# Patient Record
Sex: MALE | Race: NATIVE HAWAIIAN OR OTHER PACIFIC ISLANDER
[De-identification: names, ages, dates, MRNs, and addresses within clinical notes are randomized per-mention and may not be internally consistent; named-entity substitution may affect disease eponyms.]

---

## 2020-10-21 ENCOUNTER — HOSPITAL ENCOUNTER (EMERGENCY)
Dept: HOSPITAL 5 - ED | Age: 25
LOS: 1 days | Discharge: HOME | End: 2020-10-22
Payer: SELF-PAY

## 2020-10-21 DIAGNOSIS — F19.10: Primary | ICD-10-CM

## 2020-10-21 PROCEDURE — 82553 CREATINE MB FRACTION: CPT

## 2020-10-21 PROCEDURE — 82550 ASSAY OF CK (CPK): CPT

## 2020-10-21 PROCEDURE — 80307 DRUG TEST PRSMV CHEM ANLYZR: CPT

## 2020-10-21 PROCEDURE — 84484 ASSAY OF TROPONIN QUANT: CPT

## 2020-10-21 PROCEDURE — G0480 DRUG TEST DEF 1-7 CLASSES: HCPCS

## 2020-10-21 PROCEDURE — 82140 ASSAY OF AMMONIA: CPT

## 2020-10-21 PROCEDURE — 80320 DRUG SCREEN QUANTALCOHOLS: CPT

## 2020-10-21 PROCEDURE — 96360 HYDRATION IV INFUSION INIT: CPT

## 2020-10-21 PROCEDURE — 99284 EMERGENCY DEPT VISIT MOD MDM: CPT

## 2020-10-21 PROCEDURE — 36415 COLL VENOUS BLD VENIPUNCTURE: CPT

## 2020-10-21 PROCEDURE — 93005 ELECTROCARDIOGRAM TRACING: CPT

## 2020-10-21 PROCEDURE — 80053 COMPREHEN METABOLIC PANEL: CPT

## 2020-10-21 PROCEDURE — 96361 HYDRATE IV INFUSION ADD-ON: CPT

## 2020-10-21 PROCEDURE — 85025 COMPLETE CBC W/AUTO DIFF WBC: CPT

## 2020-10-21 NOTE — EMERGENCY DEPARTMENT REPORT
HPI





- General


Chief Complaint: Overdose


Time Seen by Provider: 10/21/20 23:21





- HPI


HPI: 





Room 24








The patient is a 25-year-old male present with a chief complaint of altered 

mental status.  Patient brought in by EMS after a possible overdose.  Apparently

per the girlfriend the patient was seen around friends who used GHB.  The 

patient was reportedly not behaving like himself and the girlfriend attempted to

wake him up by placing him in the shower.  EMS was called and administered 2 mg 

of Narcan prior to arrival.  During my interview the patient mumbles to himself 

but does not answer questions.





ED Past Medical Hx





- Past Medical History


Previous Medical History?: No





- Surgical History


Past Surgical History?: No





- Family History


Family history: no significant





- Social History


Smoking Status: Unknown if ever smoked





ED Review of Systems


ROS: 


Stated complaint: OVERDOSE


Other details as noted in HPI





Comment: Unobtainable due to pts medical conditions





Physical Exam





- Physical Exam


Physical Exam: 





GENERAL: The patient is well-developed well-nourished male curled up in fetal 

position on stretcher occasionally mumbling to himself. []


HEENT: Normocephalic.  Atraumatic.   Patient has moist mucous membranes.


NECK: Supple.  Trachea midline


CHEST/LUNGS: Clear to auscultation.  There is no respiratory distress noted.


HEART/CARDIOVASCULAR: Regular.  There is no tachycardia.  There is no gallop rub

 or murmur.


ABDOMEN: Abdomen is soft, nontender.  Patient has normal bowel sounds.  There is

 no abdominal distention.


SKIN: There is no rash.  There is no edema.  There is no diaphoresis.


NEURO: The patient is asleep but awakens to tactile and verbal stimuli.  Patient

 mumbles to himself but does not answer questions.  The patient is not 

cooperative with neurologic exam. 


MUSCULOSKELETAL: There is no evidence of acute injury.





ED Course





- Reevaluation(s)


Reevaluation #1: 





10/22/20 01:32


Patient now awake.  Patient admits to consuming GHB.  Patient denies complaints





ED Medical Decision Making





- Lab Data


Result diagrams: 


                                10/22/20 Unknown





                                 10/22/20 01:50





                                Laboratory Tests











  10/21/20 10/21/20 10/22/20





  23:40 23:40 00:05


 


WBC   


 


RBC   


 


Hgb   


 


Hct   


 


MCV   


 


MCH   


 


MCHC   


 


RDW   


 


Plt Count   


 


Lymph % (Auto)   


 


Mono % (Auto)   


 


Eos % (Auto)   


 


Baso % (Auto)   


 


Lymph # (Auto)   


 


Mono # (Auto)   


 


Eos # (Auto)   


 


Baso # (Auto)   


 


Seg Neutrophils %   


 


Seg Neutrophils #   


 


Sodium   


 


Potassium   


 


Chloride   


 


Carbon Dioxide   


 


Anion Gap   


 


BUN   


 


Creatinine   


 


Estimated GFR   


 


BUN/Creatinine Ratio   


 


Glucose   


 


Calcium   


 


Total Bilirubin   


 


AST   


 


ALT   


 


Alkaline Phosphatase   


 


Ammonia  49.0  


 


Total Creatine Kinase   


 


CK-MB (CK-2)   


 


CK-MB (CK-2) Rel Index   


 


Troponin T   


 


Total Protein   


 


Albumin   


 


Albumin/Globulin Ratio   


 


Urine Opiates Screen    Presumptive negative


 


Urine Methadone Screen    Presumptive negative


 


Ur Barbiturates Screen    Presumptive negative


 


Ur Phencyclidine Scrn    Presumptive negative


 


Ur Amphetamines Screen    Presumptive positive


 


U Benzodiazepines Scrn    Presumptive negative


 


Urine Cocaine Screen    Presumptive negative


 


U Marijuana (THC) Screen    Presumptive positive


 


Drugs of Abuse Note    Disclamer


 


Plasma/Serum Alcohol   < 0.01 














  10/22/20 10/22/20





  01:50 Unknown


 


WBC   10.1


 


RBC   5.94 H


 


Hgb   19.1 H


 


Hct   54.1 H


 


MCV   91


 


MCH   32


 


MCHC   35 H


 


RDW   13.8


 


Plt Count   256


 


Lymph % (Auto)   14.6


 


Mono % (Auto)   7.2


 


Eos % (Auto)   1.2


 


Baso % (Auto)   0.9


 


Lymph # (Auto)   1.5


 


Mono # (Auto)   0.7


 


Eos # (Auto)   0.1


 


Baso # (Auto)   0.1


 


Seg Neutrophils %   76.1 H


 


Seg Neutrophils #   7.7


 


Sodium  142 


 


Potassium  3.9 


 


Chloride  100.6 


 


Carbon Dioxide  23 


 


Anion Gap  22 


 


BUN  6 L 


 


Creatinine  1.0 


 


Estimated GFR  > 60 


 


BUN/Creatinine Ratio  6 


 


Glucose  88 


 


Calcium  9.7 


 


Total Bilirubin  0.60 


 


AST  26 


 


ALT  17 


 


Alkaline Phosphatase  92 


 


Ammonia  


 


Total Creatine Kinase  556 H 


 


CK-MB (CK-2)  4.7 H 


 


CK-MB (CK-2) Rel Index  0.8 


 


Troponin T  < 0.010 


 


Total Protein  7.7 


 


Albumin  5.1 H 


 


Albumin/Globulin Ratio  2.0 


 


Urine Opiates Screen  


 


Urine Methadone Screen  


 


Ur Barbiturates Screen  


 


Ur Phencyclidine Scrn  


 


Ur Amphetamines Screen  


 


U Benzodiazepines Scrn  


 


Urine Cocaine Screen  


 


U Marijuana (THC) Screen  


 


Drugs of Abuse Note  


 


Plasma/Serum Alcohol  














- EKG Data


-: EKG Interpreted by Me


EKG shows normal: sinus rhythm


Rate: normal





- EKG Data


When compared to previous EKG there are: previous EKG unavailable


Interpretation: nonspecific ST-T wave teja (Early repolarization)





- Differential Diagnosis


Substance abuse, electrolyte abnormality, rhabdomyolysis,


Critical care attestation.: 


If time is entered above; I have spent that time in minutes in the direct care 

of this critically ill patient, excluding procedure time.








ED Disposition


Clinical Impression: 


 Polysubstance abuse





Disposition: DC-01 TO HOME OR SELFCARE


Is pt being admited?: No


Does the pt Need Aspirin: No


Condition: Stable


Instructions:  Polysubstance Abuse (ED)


Additional Instructions: 


Return to the emergency department should you develop worsening symptoms, 

inability to tolerate food or liquids, high fever or any other concerns


Referrals: 


PRIMARY CARE,MD [Primary Care Provider] - 3-5 Days


[facility] [Outside] - 3-5 Days

## 2020-10-22 VITALS — SYSTOLIC BLOOD PRESSURE: 115 MMHG | DIASTOLIC BLOOD PRESSURE: 72 MMHG

## 2020-10-22 LAB
ALBUMIN SERPL-MCNC: 5.1 G/DL (ref 3.9–5)
ALT SERPL-CCNC: 17 UNITS/L (ref 7–56)
BASOPHILS # (AUTO): 0.1 K/MM3 (ref 0–0.1)
BASOPHILS NFR BLD AUTO: 0.9 % (ref 0–1.8)
BENZODIAZEPINES SCREEN,URINE: (no result)
BUN SERPL-MCNC: 6 MG/DL (ref 9–20)
BUN/CREAT SERPL: 6 %
CALCIUM SERPL-MCNC: 9.7 MG/DL (ref 8.4–10.2)
EOSINOPHIL # BLD AUTO: 0.1 K/MM3 (ref 0–0.4)
EOSINOPHIL NFR BLD AUTO: 1.2 % (ref 0–4.3)
HCT VFR BLD CALC: 54.1 % (ref 35.5–45.6)
HEMOLYSIS INDEX: 29
HGB BLD-MCNC: 19.1 GM/DL (ref 11.8–15.2)
LYMPHOCYTES # BLD AUTO: 1.5 K/MM3 (ref 1.2–5.4)
LYMPHOCYTES NFR BLD AUTO: 14.6 % (ref 13.4–35)
MCHC RBC AUTO-ENTMCNC: 35 % (ref 32–34)
MCV RBC AUTO: 91 FL (ref 84–94)
METHADONE SCREEN,URINE: (no result)
MONOCYTES # (AUTO): 0.7 K/MM3 (ref 0–0.8)
MONOCYTES % (AUTO): 7.2 % (ref 0–7.3)
OPIATE SCREEN,URINE: (no result)
PLATELET # BLD: 256 K/MM3 (ref 140–440)
RBC # BLD AUTO: 5.94 M/MM3 (ref 3.65–5.03)

## 2021-04-04 ENCOUNTER — HOSPITAL ENCOUNTER (EMERGENCY)
Dept: HOSPITAL 5 - ED | Age: 26
LOS: 1 days | Discharge: HOME | End: 2021-04-05
Payer: SELF-PAY

## 2021-04-04 DIAGNOSIS — F12.90: ICD-10-CM

## 2021-04-04 DIAGNOSIS — T42.4X1A: Primary | ICD-10-CM

## 2021-04-04 DIAGNOSIS — F11.129: ICD-10-CM

## 2021-04-04 DIAGNOSIS — F19.10: ICD-10-CM

## 2021-04-04 DIAGNOSIS — F17.200: ICD-10-CM
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
## 2021-04-04 PROCEDURE — G0480 DRUG TEST DEF 1-7 CLASSES: HCPCS

## 2021-04-04 PROCEDURE — 96361 HYDRATE IV INFUSION ADD-ON: CPT

## 2021-04-04 PROCEDURE — 96374 THER/PROPH/DIAG INJ IV PUSH: CPT

## 2021-04-04 PROCEDURE — 99284 EMERGENCY DEPT VISIT MOD MDM: CPT

## 2021-04-04 PROCEDURE — 36415 COLL VENOUS BLD VENIPUNCTURE: CPT

## 2021-04-04 PROCEDURE — 80320 DRUG SCREEN QUANTALCOHOLS: CPT

## 2021-04-04 PROCEDURE — 70450 CT HEAD/BRAIN W/O DYE: CPT

## 2021-04-04 NOTE — EMERGENCY DEPARTMENT REPORT
History of Present Illness





- General


Chief Complaint: Overdose


Stated Complaint: POSS OD


Time Seen by Provider: 04/04/21 20:27


Source: family


Mode of arrival: Ambulatory


Limitations: Altered Mental Status





- History of Present Illness


Initial Comments: 





Chief complaint: Overdose, "I think he just took too much medicine."





HPI: This is a 25-year-old male history of polysubstance abuse who is brought in

by his brother for likely drug overdose.  He was called by friends.  He was at a

friend's home when patient started twitching.  He became less responsive.  He 

was drooling.  Brother brought his brother to the emergency department.  He 

notes that his brother likely took Xanax and opioid in pill form.  He has been 

using heavily for at least 6 months.  No known history of depression.  His 

brother thinks that he "needs help".  According to brother Mr. Lunsford is 

unemployed.  Mr. Lunsford lives with his girlfriend who also is a drug user.  

Mr. Lunsford has been known to use marijuana and alcohol in addition to 

prescription medications.  Patient is currently drooling unresponsive but moving

purposefully





According to electronic medical record, patient was seen at this hospital 

October 2020, patient overdose after taking GHB.


MD Complaint: accidental overdose


-: This evening


How Overdose Was Discovered: called family/friend


Context: Accidental Overdose: wanted to get high


Associated Symptoms: other (Lethargy, twitching, drooling)


Treatments Prior to Arrival: other (Brother brought patient to the emergency 

department)





- Related Data


                                    Allergies











Allergy/AdvReac Type Severity Reaction Status Date / Time


 


Unable to Assess Allergy   Unverified 10/21/20 23:16














ED Review of Systems


ROS: 


Stated complaint: POSS OD


Other details as noted in HPI





Comment: Unobtainable due to pts medical conditions (Patient is altered, 

intoxicated)





ED Past Medical Hx





- Past Medical History


Previous Medical History?: No





- Surgical History


Past Surgical History?: No





- Social History


Smoking Status: Current Every Day Smoker


Substance Use Type: Alcohol, Marijuana, Non Opiate Pain, Prescribed, 

Tranquilizers





ED Physical Exam





- General


Limitations: Altered Mental Status


General appearance: lethargic, other (Drooling restless pulls away from IV 

stick)





- Head


Head exam: Present: atraumatic, normocephalic





- Eye


Eye exam: Absent: scleral icterus, conjunctival injection





- ENT


ENT exam: Present: mucous membranes moist





- Neck


Neck exam: Present: normal inspection, full ROM





- Respiratory


Respiratory exam: Present: normal lung sounds bilaterally.  Absent: respiratory 

distress, wheezes, rales, rhonchi, stridor





- Cardiovascular


Cardiovascular Exam: Present: regular rate, normal rhythm, normal heart sounds. 

Absent: systolic murmur, diastolic murmur, rubs, gallop





- GI/Abdominal


GI/Abdominal exam: Present: soft, normal bowel sounds.  Absent: distended, 

tenderness, guarding, rebound





- Rectal


Rectal exam: Present: deferred





- Extremities Exam


Extremities exam: Present: normal inspection





- Back Exam


Back exam: Present: normal inspection





- Neurological Exam


Neurological exam: Present: altered





- Psychiatric


Psychiatric exam: Present: flat affect





- Skin


Skin exam: Present: warm, dry, intact, pallor.  Absent: rash





ED Course


                                   Vital Signs











  04/04/21 04/04/21





  20:29 20:42


 


Temperature 97.0 F L 


 


Pulse Rate 70 


 


Respiratory 12 12





Rate  


 


Blood Pressure 113/60 


 


O2 Sat by Pulse 90 





Oximetry  














- Reevaluation(s)


Reevaluation #1: 





04/05/21 00:33


I reevaluation.  Patient is sleeping but maintaining airway.  He does withdraw 

from noxious stimuli.  He will not answer questions.





ED Medical Decision Making





- Radiology Data


Radiology results: image reviewed


interpreted by me: 





personal interpretation of Head CT without contrast: no ICH, no large mass





- Medical Decision Making





Accidental drug overdose: Polysubstance abuse with benzodiazepine and opioid 

medication in pill form.





After returning from CT scan, patient now is awake alert.  He is following 

instructions.  He is stable for discharge.


Critical Care Time: Yes


Critical care time in (mins) excluding proc time.: 40


Critical care attestation.: 


If time is entered above; I have spent that time in minutes in the direct care 

of this critically ill patient, excluding procedure time.


40 minutes of critical care time excluding procedures were used in the care of 

the patient.   I came immediately to the bedside upon patient's arrival.  I was 

called overhead out of another patient's room for critically ill patient.  I 

spoke with respiratory therapist.  I respiratory therapist agreed that patient 

did not require intubation.  I discussed treatment plan with the nursing team 

members.  Patient immediately received naloxone with response of mild agitation.

 I obtained history from brother at the bedside.  I was concerned for 

cardiovascular collapse.  Concern for imminent airway compromise.  Concern for 

aspiration.  Patient required multiple interventions and reassessments.





ED Disposition


Clinical Impression: 


 Accidental overdose, Polysubstance abuse, Opioid abuse with intoxication with 

complication, Benzodiazepine overdose





Disposition: DC-01 TO HOME OR SELFCARE


Is pt being admited?: No


Does the pt Need Aspirin: No


Condition: Stable


Instructions:  Substance Use Disorder, Opioid Use Disorder


Referrals: 


[facility] [Outside] - 3-5 Days

## 2021-04-05 VITALS — SYSTOLIC BLOOD PRESSURE: 108 MMHG | DIASTOLIC BLOOD PRESSURE: 71 MMHG

## 2021-04-05 NOTE — CAT SCAN REPORT
CT HEAD WITHOUT CONTRAST



INDICATION / CLINICAL INFORMATION: altered mental status twitching.



TECHNIQUE: All CT scans at this location are performed using CT dose reduction for ALARA by means of 
automated exposure control. 



COMPARISON: None available.



FINDINGS:

HEMORRHAGE: None.

EXTRA-AXIAL SPACES: Normal in size and morphology for the patient's age.

VENTRICULAR SYSTEM: Normal in size and morphology for the patient's age.

CEREBRAL PARENCHYMA: No significant abnormality. No acute territorial infarct. 

MIDLINE SHIFT / HERNIATION: None.

CEREBELLUM / BRAINSTEM: No significant abnormality.



ORBITS: Normal as visualized.

SOFT TISSUES: No significant abnormality.

SKULL: No significant abnormality.

PARANASAL SINUSES / MASTOID AIR CELLS: There is right maxillary sinus disease. There is mild mucosal 
thickening in the left maxillary sinus



ADDITIONAL FINDINGS: None.



IMPRESSION:

1. No acute intracranial abnormality. 

2. There is maxillary sinus disease.



Signer Name: Art Amaya MD 

Signed: 4/5/2021 1:49 AM

Workstation Name: [id number]